# Patient Record
Sex: FEMALE | Race: BLACK OR AFRICAN AMERICAN | NOT HISPANIC OR LATINO | URBAN - METROPOLITAN AREA
[De-identification: names, ages, dates, MRNs, and addresses within clinical notes are randomized per-mention and may not be internally consistent; named-entity substitution may affect disease eponyms.]

---

## 2024-07-28 ENCOUNTER — EMERGENCY (EMERGENCY)
Facility: HOSPITAL | Age: 34
LOS: 0 days | Discharge: HOME | End: 2024-07-28
Attending: STUDENT IN AN ORGANIZED HEALTH CARE EDUCATION/TRAINING PROGRAM
Payer: SELF-PAY

## 2024-07-28 VITALS
DIASTOLIC BLOOD PRESSURE: 73 MMHG | RESPIRATION RATE: 18 BRPM | OXYGEN SATURATION: 98 % | HEIGHT: 67 IN | TEMPERATURE: 98 F | WEIGHT: 126.1 LBS | SYSTOLIC BLOOD PRESSURE: 108 MMHG | HEART RATE: 80 BPM

## 2024-07-28 VITALS
DIASTOLIC BLOOD PRESSURE: 72 MMHG | HEART RATE: 63 BPM | OXYGEN SATURATION: 100 % | RESPIRATION RATE: 16 BRPM | TEMPERATURE: 99 F | SYSTOLIC BLOOD PRESSURE: 104 MMHG

## 2024-07-28 DIAGNOSIS — R11.2 NAUSEA WITH VOMITING, UNSPECIFIED: ICD-10-CM

## 2024-07-28 DIAGNOSIS — Z86.69 PERSONAL HISTORY OF OTHER DISEASES OF THE NERVOUS SYSTEM AND SENSE ORGANS: ICD-10-CM

## 2024-07-28 DIAGNOSIS — R51.9 HEADACHE, UNSPECIFIED: ICD-10-CM

## 2024-07-28 DIAGNOSIS — G43.909 MIGRAINE, UNSPECIFIED, NOT INTRACTABLE, WITHOUT STATUS MIGRAINOSUS: ICD-10-CM

## 2024-07-28 PROCEDURE — 99053 MED SERV 10PM-8AM 24 HR FAC: CPT

## 2024-07-28 PROCEDURE — 99284 EMERGENCY DEPT VISIT MOD MDM: CPT

## 2024-07-28 RX ORDER — SUMATRIPTAN 100 MG/1
1 TABLET, FILM COATED ORAL
Qty: 5 | Refills: 0
Start: 2024-07-28 | End: 2024-08-01

## 2024-07-28 RX ORDER — SUMATRIPTAN 100 MG/1
50 TABLET, FILM COATED ORAL ONCE
Refills: 0 | Status: COMPLETED | OUTPATIENT
Start: 2024-07-28 | End: 2024-07-28

## 2024-07-28 RX ORDER — RIZATRIPTAN BENZOATE 5 MG/1
1 TABLET ORAL
Qty: 5 | Refills: 0
Start: 2024-07-28 | End: 2024-08-11

## 2024-07-28 RX ADMIN — Medication 1000 MILLILITER(S): at 08:01

## 2024-07-28 RX ADMIN — SUMATRIPTAN 50 MILLIGRAM(S): 100 TABLET, FILM COATED ORAL at 07:45

## 2024-09-19 ENCOUNTER — EMERGENCY (EMERGENCY)
Facility: HOSPITAL | Age: 34
LOS: 1 days | Discharge: HOME | End: 2024-09-19
Attending: STUDENT IN AN ORGANIZED HEALTH CARE EDUCATION/TRAINING PROGRAM | Admitting: STUDENT IN AN ORGANIZED HEALTH CARE EDUCATION/TRAINING PROGRAM
Payer: MEDICAID

## 2024-09-19 VITALS
TEMPERATURE: 98 F | DIASTOLIC BLOOD PRESSURE: 69 MMHG | WEIGHT: 128.09 LBS | OXYGEN SATURATION: 98 % | RESPIRATION RATE: 20 BRPM | SYSTOLIC BLOOD PRESSURE: 102 MMHG | HEART RATE: 84 BPM

## 2024-09-19 LAB
ALBUMIN SERPL ELPH-MCNC: 4.8 G/DL — SIGNIFICANT CHANGE UP (ref 3.3–5)
ALP SERPL-CCNC: 48 U/L — SIGNIFICANT CHANGE UP (ref 40–120)
ALT FLD-CCNC: 12 U/L — SIGNIFICANT CHANGE UP (ref 4–33)
AMPHET UR-MCNC: NEGATIVE — SIGNIFICANT CHANGE UP
ANION GAP SERPL CALC-SCNC: 14 MMOL/L — SIGNIFICANT CHANGE UP (ref 7–14)
APAP SERPL-MCNC: <10 UG/ML — LOW (ref 15–25)
APPEARANCE UR: ABNORMAL
AST SERPL-CCNC: 16 U/L — SIGNIFICANT CHANGE UP (ref 4–32)
BACTERIA # UR AUTO: NEGATIVE /HPF — SIGNIFICANT CHANGE UP
BARBITURATES UR SCN-MCNC: NEGATIVE — SIGNIFICANT CHANGE UP
BASOPHILS # BLD AUTO: 0.02 K/UL — SIGNIFICANT CHANGE UP (ref 0–0.2)
BASOPHILS NFR BLD AUTO: 0.2 % — SIGNIFICANT CHANGE UP (ref 0–2)
BENZODIAZ UR-MCNC: NEGATIVE — SIGNIFICANT CHANGE UP
BILIRUB SERPL-MCNC: 0.4 MG/DL — SIGNIFICANT CHANGE UP (ref 0.2–1.2)
BILIRUB UR-MCNC: NEGATIVE — SIGNIFICANT CHANGE UP
BUN SERPL-MCNC: 25 MG/DL — HIGH (ref 7–23)
CALCIUM SERPL-MCNC: 9.9 MG/DL — SIGNIFICANT CHANGE UP (ref 8.4–10.5)
CAST: 1 /LPF — SIGNIFICANT CHANGE UP (ref 0–4)
CHLORIDE SERPL-SCNC: 102 MMOL/L — SIGNIFICANT CHANGE UP (ref 98–107)
CO2 SERPL-SCNC: 23 MMOL/L — SIGNIFICANT CHANGE UP (ref 22–31)
COCAINE METAB.OTHER UR-MCNC: NEGATIVE — SIGNIFICANT CHANGE UP
COLOR SPEC: YELLOW — SIGNIFICANT CHANGE UP
CREAT SERPL-MCNC: 0.91 MG/DL — SIGNIFICANT CHANGE UP (ref 0.5–1.3)
CREATININE URINE RESULT, DAU: 234 MG/DL — SIGNIFICANT CHANGE UP
DIFF PNL FLD: NEGATIVE — SIGNIFICANT CHANGE UP
EGFR: 85 ML/MIN/1.73M2 — SIGNIFICANT CHANGE UP
EGFR: 85 ML/MIN/1.73M2 — SIGNIFICANT CHANGE UP
EOSINOPHIL # BLD AUTO: 0 K/UL — SIGNIFICANT CHANGE UP (ref 0–0.5)
EOSINOPHIL NFR BLD AUTO: 0 % — SIGNIFICANT CHANGE UP (ref 0–6)
ETHANOL SERPL-MCNC: <10 MG/DL — SIGNIFICANT CHANGE UP
FENTANYL UR QL SCN: NEGATIVE — SIGNIFICANT CHANGE UP
FLUAV AG NPH QL: SIGNIFICANT CHANGE UP
FLUBV AG NPH QL: SIGNIFICANT CHANGE UP
GLUCOSE SERPL-MCNC: 132 MG/DL — HIGH (ref 70–99)
GLUCOSE UR QL: NEGATIVE MG/DL — SIGNIFICANT CHANGE UP
HCG SERPL-ACNC: <1 MIU/ML — SIGNIFICANT CHANGE UP
HCT VFR BLD CALC: 35.8 % — SIGNIFICANT CHANGE UP (ref 34.5–45)
HGB BLD-MCNC: 11.9 G/DL — SIGNIFICANT CHANGE UP (ref 11.5–15.5)
IANC: 6.67 K/UL — SIGNIFICANT CHANGE UP (ref 1.8–7.4)
IMM GRANULOCYTES NFR BLD AUTO: 0.4 % — SIGNIFICANT CHANGE UP (ref 0–0.9)
KETONES UR-MCNC: 15 MG/DL
LEUKOCYTE ESTERASE UR-ACNC: NEGATIVE — SIGNIFICANT CHANGE UP
LYMPHOCYTES # BLD AUTO: 1.15 K/UL — SIGNIFICANT CHANGE UP (ref 1–3.3)
LYMPHOCYTES # BLD AUTO: 13.9 % — SIGNIFICANT CHANGE UP (ref 13–44)
MCHC RBC-ENTMCNC: 26.4 PG — LOW (ref 27–34)
MCHC RBC-ENTMCNC: 33.2 GM/DL — SIGNIFICANT CHANGE UP (ref 32–36)
MCV RBC AUTO: 79.4 FL — LOW (ref 80–100)
METHADONE UR-MCNC: NEGATIVE — SIGNIFICANT CHANGE UP
MONOCYTES # BLD AUTO: 0.4 K/UL — SIGNIFICANT CHANGE UP (ref 0–0.9)
MONOCYTES NFR BLD AUTO: 4.8 % — SIGNIFICANT CHANGE UP (ref 2–14)
NEUTROPHILS # BLD AUTO: 6.67 K/UL — SIGNIFICANT CHANGE UP (ref 1.8–7.4)
NEUTROPHILS NFR BLD AUTO: 80.7 % — HIGH (ref 43–77)
NITRITE UR-MCNC: NEGATIVE — SIGNIFICANT CHANGE UP
NRBC # BLD AUTO: 0 K/UL — SIGNIFICANT CHANGE UP (ref 0–0)
NRBC # BLD: 0 /100 WBCS — SIGNIFICANT CHANGE UP (ref 0–0)
NRBC # FLD: 0 K/UL — SIGNIFICANT CHANGE UP (ref 0–0)
NRBC BLD-RTO: 0 /100 WBCS — SIGNIFICANT CHANGE UP (ref 0–0)
OPIATES UR-MCNC: NEGATIVE — SIGNIFICANT CHANGE UP
OXYCODONE UR-MCNC: NEGATIVE — SIGNIFICANT CHANGE UP
PCP SPEC-MCNC: SIGNIFICANT CHANGE UP
PCP UR-MCNC: NEGATIVE — SIGNIFICANT CHANGE UP
PH UR: 5.5 — SIGNIFICANT CHANGE UP (ref 5–8)
PLATELET # BLD AUTO: 352 K/UL — SIGNIFICANT CHANGE UP (ref 150–400)
POTASSIUM SERPL-MCNC: 4.1 MMOL/L — SIGNIFICANT CHANGE UP (ref 3.5–5.3)
POTASSIUM SERPL-SCNC: 4.1 MMOL/L — SIGNIFICANT CHANGE UP (ref 3.5–5.3)
PROT SERPL-MCNC: 8.3 G/DL — SIGNIFICANT CHANGE UP (ref 6–8.3)
PROT UR-MCNC: 30 MG/DL
RBC # BLD: 4.51 M/UL — SIGNIFICANT CHANGE UP (ref 3.8–5.2)
RBC # FLD: 14.6 % — HIGH (ref 10.3–14.5)
RBC CASTS # UR COMP ASSIST: 2 /HPF — SIGNIFICANT CHANGE UP (ref 0–4)
RSV RNA NPH QL NAA+NON-PROBE: SIGNIFICANT CHANGE UP
SALICYLATES SERPL-MCNC: <0.3 MG/DL — LOW (ref 15–30)
SARS-COV-2 RNA SPEC QL NAA+PROBE: SIGNIFICANT CHANGE UP
SODIUM SERPL-SCNC: 139 MMOL/L — SIGNIFICANT CHANGE UP (ref 135–145)
SP GR SPEC: 1.03 — SIGNIFICANT CHANGE UP (ref 1–1.03)
SQUAMOUS # UR AUTO: 3 /HPF — SIGNIFICANT CHANGE UP (ref 0–5)
THC UR QL: POSITIVE
TOXICOLOGY SCREEN, DRUGS OF ABUSE, SERUM RESULT: SIGNIFICANT CHANGE UP
TSH SERPL-MCNC: 0.6 UIU/ML — SIGNIFICANT CHANGE UP (ref 0.27–4.2)
UROBILINOGEN FLD QL: 0.2 MG/DL — SIGNIFICANT CHANGE UP (ref 0.2–1)
WBC # BLD: 8.27 K/UL — SIGNIFICANT CHANGE UP (ref 3.8–10.5)
WBC # FLD AUTO: 8.27 K/UL — SIGNIFICANT CHANGE UP (ref 3.8–10.5)
WBC UR QL: 1 /HPF — SIGNIFICANT CHANGE UP (ref 0–5)

## 2024-09-19 PROCEDURE — 99284 EMERGENCY DEPT VISIT MOD MDM: CPT

## 2024-09-19 RX ORDER — ALPRAZOLAM 0.5 MG
1 TABLET, EXTENDED RELEASE 24 HR ORAL
Qty: 1 | Refills: 0
Start: 2024-09-19 | End: 2024-09-19

## 2024-09-19 RX ADMIN — Medication 1000 MILLILITER(S): at 13:16

## 2024-10-10 ENCOUNTER — EMERGENCY (EMERGENCY)
Facility: HOSPITAL | Age: 34
LOS: 1 days | Discharge: HOME | End: 2024-10-10
Attending: STUDENT IN AN ORGANIZED HEALTH CARE EDUCATION/TRAINING PROGRAM | Admitting: STUDENT IN AN ORGANIZED HEALTH CARE EDUCATION/TRAINING PROGRAM
Payer: MEDICAID

## 2024-10-10 VITALS
TEMPERATURE: 98 F | OXYGEN SATURATION: 99 % | DIASTOLIC BLOOD PRESSURE: 76 MMHG | HEART RATE: 91 BPM | RESPIRATION RATE: 17 BRPM | SYSTOLIC BLOOD PRESSURE: 117 MMHG

## 2024-10-10 PROBLEM — G43.909 MIGRAINE, UNSPECIFIED, NOT INTRACTABLE, WITHOUT STATUS MIGRAINOSUS: Chronic | Status: ACTIVE | Noted: 2024-07-28

## 2024-10-10 PROBLEM — D64.9 ANEMIA, UNSPECIFIED: Chronic | Status: ACTIVE | Noted: 2024-09-19

## 2024-10-10 PROBLEM — F41.9 ANXIETY DISORDER, UNSPECIFIED: Chronic | Status: ACTIVE | Noted: 2024-09-19

## 2024-10-10 LAB
ALBUMIN SERPL ELPH-MCNC: 4.7 G/DL — SIGNIFICANT CHANGE UP (ref 3.3–5)
ALP SERPL-CCNC: 50 U/L — SIGNIFICANT CHANGE UP (ref 40–120)
ALT FLD-CCNC: 10 U/L — SIGNIFICANT CHANGE UP (ref 4–33)
ANION GAP SERPL CALC-SCNC: 15 MMOL/L — HIGH (ref 7–14)
AST SERPL-CCNC: 21 U/L — SIGNIFICANT CHANGE UP (ref 4–32)
BILIRUB SERPL-MCNC: 0.2 MG/DL — SIGNIFICANT CHANGE UP (ref 0.2–1.2)
BUN SERPL-MCNC: 19 MG/DL — SIGNIFICANT CHANGE UP (ref 7–23)
CALCIUM SERPL-MCNC: 9.6 MG/DL — SIGNIFICANT CHANGE UP (ref 8.4–10.5)
CHLORIDE SERPL-SCNC: 105 MMOL/L — SIGNIFICANT CHANGE UP (ref 98–107)
CO2 SERPL-SCNC: 20 MMOL/L — LOW (ref 22–31)
CREAT SERPL-MCNC: 0.83 MG/DL — SIGNIFICANT CHANGE UP (ref 0.5–1.3)
EGFR: 95 ML/MIN/1.73M2 — SIGNIFICANT CHANGE UP
GLUCOSE SERPL-MCNC: 120 MG/DL — HIGH (ref 70–99)
HCG SERPL-ACNC: <1 MIU/ML — SIGNIFICANT CHANGE UP
HCT VFR BLD CALC: 35.9 % — SIGNIFICANT CHANGE UP (ref 34.5–45)
HGB BLD-MCNC: 11.9 G/DL — SIGNIFICANT CHANGE UP (ref 11.5–15.5)
LIDOCAIN IGE QN: 77 U/L — HIGH (ref 7–60)
MCHC RBC-ENTMCNC: 26.4 PG — LOW (ref 27–34)
MCHC RBC-ENTMCNC: 33.1 GM/DL — SIGNIFICANT CHANGE UP (ref 32–36)
MCV RBC AUTO: 79.6 FL — LOW (ref 80–100)
NRBC # BLD: 0 /100 WBCS — SIGNIFICANT CHANGE UP (ref 0–0)
NRBC # FLD: 0 K/UL — SIGNIFICANT CHANGE UP (ref 0–0)
PLATELET # BLD AUTO: 273 K/UL — SIGNIFICANT CHANGE UP (ref 150–400)
POTASSIUM SERPL-MCNC: 4.1 MMOL/L — SIGNIFICANT CHANGE UP (ref 3.5–5.3)
POTASSIUM SERPL-SCNC: 4.1 MMOL/L — SIGNIFICANT CHANGE UP (ref 3.5–5.3)
PROT SERPL-MCNC: 8.2 G/DL — SIGNIFICANT CHANGE UP (ref 6–8.3)
RBC # BLD: 4.51 M/UL — SIGNIFICANT CHANGE UP (ref 3.8–5.2)
RBC # FLD: 14 % — SIGNIFICANT CHANGE UP (ref 10.3–14.5)
SODIUM SERPL-SCNC: 140 MMOL/L — SIGNIFICANT CHANGE UP (ref 135–145)
WBC # BLD: 7.41 K/UL — SIGNIFICANT CHANGE UP (ref 3.8–10.5)
WBC # FLD AUTO: 7.41 K/UL — SIGNIFICANT CHANGE UP (ref 3.8–10.5)

## 2024-10-10 PROCEDURE — 93010 ELECTROCARDIOGRAM REPORT: CPT

## 2024-10-10 PROCEDURE — 99284 EMERGENCY DEPT VISIT MOD MDM: CPT

## 2024-10-10 RX ORDER — SODIUM CHLORIDE 0.9 % (FLUSH) 0.9 %
1000 SYRINGE (ML) INJECTION ONCE
Refills: 0 | Status: COMPLETED | OUTPATIENT
Start: 2024-10-10 | End: 2024-10-10

## 2024-10-10 RX ORDER — ONDANSETRON HCL/PF 4 MG/2 ML
4 VIAL (ML) INJECTION ONCE
Refills: 0 | Status: COMPLETED | OUTPATIENT
Start: 2024-10-10 | End: 2024-10-10

## 2024-10-10 RX ORDER — HALOPERIDOL LACTATE 2 MG/ML
2 CONCENTRATE, ORAL ORAL ONCE
Refills: 0 | Status: COMPLETED | OUTPATIENT
Start: 2024-10-10 | End: 2024-10-10

## 2024-10-10 RX ADMIN — Medication 1000 MILLILITER(S): at 17:42

## 2024-10-10 RX ADMIN — Medication 4 MILLIGRAM(S): at 17:43

## 2024-10-10 RX ADMIN — Medication 2 MILLIGRAM(S): at 17:42

## 2024-10-10 NOTE — ED PROVIDER NOTE - CLINICAL SUMMARY MEDICAL DECISION MAKING FREE TEXT BOX
33 yo F with h/o anxiety, daily marijuana use who presents to the ED c/o 3 days of worsening anxiety, nausea, and vomiting. Pt has had similar symptoms before, was told it was due to her marijuana use. On exam, pt has dry mucous membranes but abdomen is soft and non-tender. Most likely cyclic vomiting. Plan for labs, IV fluids, medications for symptom control

## 2024-10-10 NOTE — ED ADULT NURSE NOTE - OBJECTIVE STATEMENT
Received pt to intake 9, A+Ox4, ambulatory. C/O nausea and vomiting x 3 days r/t marijuana consumption. pt states she smokes due to anxiety. Respirations even and unlabored, normal work of breathing, no accessory muscle use, speaking in full clear uninterrupted sentences. ABD is soft, non tender, non distended. Pt denies any chest pain, SOB, headache, dizziness, diarrhea, fever, chills.  20G to RAC, Labs sent, Medicated as per Provider orders, plan of care ongoing, no further concerns as of present, patient expresses no other needs at this time, call light within reach.

## 2024-10-10 NOTE — ED PROVIDER NOTE - PATIENT PORTAL LINK FT
You can access the FollowMyHealth Patient Portal offered by [facility] by registering at the following website: [email or web address]. By joining [employer]’s FollowMyHealth portal, you will also be able to view your health information using other applications (apps) compatible with our system.

## 2024-10-10 NOTE — ED PROVIDER NOTE - ENMT, MLM
Airway patent, Nasal mucosa clear. Throat has no vesicles, no oropharyngeal exudates and uvula is midline.  dry mucous membranes

## 2024-10-10 NOTE — ED PROVIDER NOTE - NSFOLLOWUPINSTRUCTIONS_ED_ALL_ED_FT
You were seen today in the emergency department. You had blood work done which was normal and were treated with medication for your symptoms. Please return to the emergency department for any new or worsening symptoms

## 2024-10-10 NOTE — ED PROVIDER NOTE - OBJECTIVE STATEMENT
33 yo F with h/o anxiety, daily marijuana use who presents to the ED c/o 3 days of worsening anxiety, nausea, and vomiting. She reports that she has been unable to tolerate PO since the symptoms began. She reports that she has had the same symptoms before when she has had increased anxiety and was told it was due to her marijuana use. She has tried to stop smoking in the past but she reports that her anxiety then get worse. No fever, chills, chest pain, SOB, abdominal pain.

## 2024-10-10 NOTE — ED ADULT TRIAGE NOTE - CHIEF COMPLAINT QUOTE
Pt presenting for 3 days of nausea and vomiting. Pt states she was diagnosed with anxiety and started using marijuana. Pt states she knows marijuana is causing the nausea and vomiting and she was told to try "a different strain, or stop using" Phx Anxiety.

## 2024-10-10 NOTE — ED PROVIDER NOTE - NSICDXPASTMEDICALHX_GEN_ALL_CORE_FT
PAST MEDICAL HISTORY:  Anemia     Anxiety     Anxiety     Anxiety and depression     Migraine     Migraines

## 2024-10-10 NOTE — ED PROVIDER NOTE - PROGRESS NOTE DETAILS
MD Phan: Pt re-assessed, feeling improved. Was able to tolerate PO. Will d/c home  Pt has information for [facility] crisis center

## 2024-10-12 ENCOUNTER — EMERGENCY (EMERGENCY)
Facility: HOSPITAL | Age: 34
LOS: 1 days | Discharge: HOME | End: 2024-10-12
Attending: STUDENT IN AN ORGANIZED HEALTH CARE EDUCATION/TRAINING PROGRAM | Admitting: EMERGENCY MEDICINE
Payer: MEDICAID

## 2024-10-12 VITALS
DIASTOLIC BLOOD PRESSURE: 74 MMHG | TEMPERATURE: 99 F | SYSTOLIC BLOOD PRESSURE: 115 MMHG | RESPIRATION RATE: 17 BRPM | OXYGEN SATURATION: 100 % | HEART RATE: 87 BPM | WEIGHT: 128.09 LBS

## 2024-10-12 LAB
ADD ON TEST-SPECIMEN IN LAB: SIGNIFICANT CHANGE UP
ALBUMIN SERPL ELPH-MCNC: 4.3 G/DL — SIGNIFICANT CHANGE UP (ref 3.3–5)
ALP SERPL-CCNC: 43 U/L — SIGNIFICANT CHANGE UP (ref 40–120)
ALT FLD-CCNC: 9 U/L — SIGNIFICANT CHANGE UP (ref 4–33)
ANION GAP SERPL CALC-SCNC: 16 MMOL/L — HIGH (ref 7–14)
APAP SERPL-MCNC: <10 UG/ML — LOW (ref 15–25)
AST SERPL-CCNC: 18 U/L — SIGNIFICANT CHANGE UP (ref 4–32)
BASOPHILS # BLD AUTO: 0.02 K/UL — SIGNIFICANT CHANGE UP (ref 0–0.2)
BASOPHILS NFR BLD AUTO: 0.3 % — SIGNIFICANT CHANGE UP (ref 0–2)
BILIRUB SERPL-MCNC: 0.3 MG/DL — SIGNIFICANT CHANGE UP (ref 0.2–1.2)
BLOOD GAS VENOUS COMPREHENSIVE RESULT: SIGNIFICANT CHANGE UP
BUN SERPL-MCNC: 14 MG/DL — SIGNIFICANT CHANGE UP (ref 7–23)
CALCIUM SERPL-MCNC: 9.2 MG/DL — SIGNIFICANT CHANGE UP (ref 8.4–10.5)
CHLORIDE SERPL-SCNC: 104 MMOL/L — SIGNIFICANT CHANGE UP (ref 98–107)
CO2 SERPL-SCNC: 22 MMOL/L — SIGNIFICANT CHANGE UP (ref 22–31)
CREAT SERPL-MCNC: 0.87 MG/DL — SIGNIFICANT CHANGE UP (ref 0.5–1.3)
EGFR: 90 ML/MIN/1.73M2 — SIGNIFICANT CHANGE UP
EOSINOPHIL # BLD AUTO: 0.12 K/UL — SIGNIFICANT CHANGE UP (ref 0–0.5)
EOSINOPHIL NFR BLD AUTO: 2 % — SIGNIFICANT CHANGE UP (ref 0–6)
ETHANOL SERPL-MCNC: <10 MG/DL — SIGNIFICANT CHANGE UP
GLUCOSE SERPL-MCNC: 90 MG/DL — SIGNIFICANT CHANGE UP (ref 70–99)
HCG SERPL-ACNC: <1 MIU/ML — SIGNIFICANT CHANGE UP
HCT VFR BLD CALC: 34.5 % — SIGNIFICANT CHANGE UP (ref 34.5–45)
HGB BLD-MCNC: 11.8 G/DL — SIGNIFICANT CHANGE UP (ref 11.5–15.5)
IANC: 3.58 K/UL — SIGNIFICANT CHANGE UP (ref 1.8–7.4)
IMM GRANULOCYTES NFR BLD AUTO: 0.3 % — SIGNIFICANT CHANGE UP (ref 0–0.9)
LIDOCAIN IGE QN: 28 U/L — SIGNIFICANT CHANGE UP (ref 7–60)
LYMPHOCYTES # BLD AUTO: 1.68 K/UL — SIGNIFICANT CHANGE UP (ref 1–3.3)
LYMPHOCYTES # BLD AUTO: 28.3 % — SIGNIFICANT CHANGE UP (ref 13–44)
MAGNESIUM SERPL-MCNC: 2.2 MG/DL — SIGNIFICANT CHANGE UP (ref 1.6–2.6)
MCHC RBC-ENTMCNC: 27.4 PG — SIGNIFICANT CHANGE UP (ref 27–34)
MCHC RBC-ENTMCNC: 34.2 GM/DL — SIGNIFICANT CHANGE UP (ref 32–36)
MCV RBC AUTO: 80.2 FL — SIGNIFICANT CHANGE UP (ref 80–100)
MONOCYTES # BLD AUTO: 0.52 K/UL — SIGNIFICANT CHANGE UP (ref 0–0.9)
MONOCYTES NFR BLD AUTO: 8.8 % — SIGNIFICANT CHANGE UP (ref 2–14)
NEUTROPHILS # BLD AUTO: 3.58 K/UL — SIGNIFICANT CHANGE UP (ref 1.8–7.4)
NEUTROPHILS NFR BLD AUTO: 60.3 % — SIGNIFICANT CHANGE UP (ref 43–77)
NRBC # BLD: 0 /100 WBCS — SIGNIFICANT CHANGE UP (ref 0–0)
NRBC # FLD: 0 K/UL — SIGNIFICANT CHANGE UP (ref 0–0)
PHOSPHATE SERPL-MCNC: 2.1 MG/DL — LOW (ref 2.5–4.5)
PLATELET # BLD AUTO: 266 K/UL — SIGNIFICANT CHANGE UP (ref 150–400)
POTASSIUM SERPL-MCNC: 3.6 MMOL/L — SIGNIFICANT CHANGE UP (ref 3.5–5.3)
POTASSIUM SERPL-SCNC: 3.6 MMOL/L — SIGNIFICANT CHANGE UP (ref 3.5–5.3)
PROT SERPL-MCNC: 7.6 G/DL — SIGNIFICANT CHANGE UP (ref 6–8.3)
RBC # BLD: 4.3 M/UL — SIGNIFICANT CHANGE UP (ref 3.8–5.2)
RBC # FLD: 14 % — SIGNIFICANT CHANGE UP (ref 10.3–14.5)
SALICYLATES SERPL-MCNC: <0.3 MG/DL — LOW (ref 15–30)
SODIUM SERPL-SCNC: 142 MMOL/L — SIGNIFICANT CHANGE UP (ref 135–145)
TOXICOLOGY SCREEN, DRUGS OF ABUSE, SERUM RESULT: SIGNIFICANT CHANGE UP
TSH SERPL-MCNC: 0.81 UIU/ML — SIGNIFICANT CHANGE UP (ref 0.27–4.2)
WBC # BLD: 5.94 K/UL — SIGNIFICANT CHANGE UP (ref 3.8–10.5)
WBC # FLD AUTO: 5.94 K/UL — SIGNIFICANT CHANGE UP (ref 3.8–10.5)

## 2024-10-12 PROCEDURE — 99223 1ST HOSP IP/OBS HIGH 75: CPT

## 2024-10-12 RX ORDER — MAG HYDROX/ALUMINUM HYD/SIMETH 200-200-20
30 SUSPENSION, ORAL (FINAL DOSE FORM) ORAL ONCE
Refills: 0 | Status: COMPLETED | OUTPATIENT
Start: 2024-10-12 | End: 2024-10-12

## 2024-10-12 RX ORDER — POTASSIUM PHOSPHATE, MONOBASIC POTASSIUM PHOSPHATE, DIBASIC 224; 236 MG/ML; MG/ML
15 INJECTION, SOLUTION, CONCENTRATE INTRAVENOUS ONCE
Refills: 0 | Status: COMPLETED | OUTPATIENT
Start: 2024-10-12 | End: 2024-10-12

## 2024-10-12 RX ORDER — SODIUM CHLORIDE IRRIG SOLUTION 0.9 %
1000 SOLUTION, IRRIGATION IRRIGATION
Refills: 0 | Status: DISCONTINUED | OUTPATIENT
Start: 2024-10-12 | End: 2024-10-16

## 2024-10-12 RX ORDER — SODIUM CHLORIDE 0.9 % (FLUSH) 0.9 %
2000 SYRINGE (ML) INJECTION ONCE
Refills: 0 | Status: COMPLETED | OUTPATIENT
Start: 2024-10-12 | End: 2024-10-12

## 2024-10-12 RX ORDER — ONDANSETRON HCL/PF 4 MG/2 ML
4 VIAL (ML) INJECTION ONCE
Refills: 0 | Status: COMPLETED | OUTPATIENT
Start: 2024-10-12 | End: 2024-10-12

## 2024-10-12 RX ORDER — FAMOTIDINE 40 MG
20 TABLET ORAL ONCE
Refills: 0 | Status: COMPLETED | OUTPATIENT
Start: 2024-10-12 | End: 2024-10-12

## 2024-10-12 RX ADMIN — Medication 100 MILLILITER(S): at 21:44

## 2024-10-12 RX ADMIN — Medication 20 MILLIGRAM(S): at 16:12

## 2024-10-12 RX ADMIN — Medication 0.5 MILLIGRAM(S): at 17:30

## 2024-10-12 RX ADMIN — Medication 4 MILLIGRAM(S): at 16:12

## 2024-10-12 RX ADMIN — Medication 4000 MILLILITER(S): at 16:12

## 2024-10-12 RX ADMIN — POTASSIUM PHOSPHATE, MONOBASIC POTASSIUM PHOSPHATE, DIBASIC 62.5 MILLIMOLE(S): 224; 236 INJECTION, SOLUTION, CONCENTRATE INTRAVENOUS at 17:31

## 2024-10-12 NOTE — ED PROVIDER NOTE - OBJECTIVE STATEMENT
34 year old female with past medical history of anxiety presents to the ED complaining of nausea and multiple episodes of nonbloody nonbilious vomiting for the past 2 days.  Has a history of anxiety which she is being treated with cannabinoid by her psychiatrist from [place], and she says for the past few days she has not been able to tolerate anything by mouth.  She was seen in our ER 2 days ago with similar symptoms and sent home, and she states she started vomiting again as soon as she got home.  She denies any abdominal pain, nausea, vomiting, fevers, chills, urinary symptoms.  She states she felt anxious again when she got home and again used cannabinoid and that led to more and more vomiting.  She denies any other complaints.  She denies any suicidal/homicidal thoughts.

## 2024-10-12 NOTE — ED CDU PROVIDER INITIAL DAY NOTE - CLINICAL SUMMARY MEDICAL DECISION MAKING FREE TEXT BOX
34 year old female with cyclic vomiting secondary to cannabinoid use for treatment of anxiety. HD stable, well appearing, in no acute distress, abdomen soft nontender. Labs reviewed, no metabolic disturbance, Phos 2.1 without no other electrolytes imbalance. CDU plan IV hydration, antiemetic, replenish phos, advance diet as tolerated.

## 2024-10-12 NOTE — ED PROVIDER NOTE - CLINICAL SUMMARY MEDICAL DECISION MAKING FREE TEXT BOX
34 year old female with past medical history of anxiety presents to the ED complaining of nausea and multiple episodes of nonbloody nonbilious vomiting for the past 2 days.  Has a history of anxiety which she is being treated with cannabinoid by her psychiatrist from [place], and she says for the past few days she has not been able to tolerate anything by mouth. Pt with stable hemodynamics, well-appearing, in no acute distress, abdomen soft nontender.  Impression: nausea/vomiting likely cannaniboid hyperemesis; anxiety with no concern for SI/HI at this time, patient is very coherent, not delusional.  Plan for labs to rule out any electrolyte/metabolic disturbance, low-dose anxiolytics, symptomatic care, if able to tolerate p.o. will DC home with Dr. Escalera follow-up versus CDU stay to advance diet and symptomatic care.  I made the patient an appointment with Dr. Escalera for Monday, October 14 at 12:30 PM.

## 2024-10-12 NOTE — ED PROVIDER NOTE - ATTENDING APP SHARED VISIT CONTRIBUTION OF CARE
35 yo F hx anxiety, on daily medical marijuana for anxiety, presenting with complaints of intractable nv. Pt seen in ED on 10/10 for similar complaints. She was able to tolerate PO at the time, but then felt she had to smoke for her anxiety and symptoms recurred. Pt does not feel she will be able to make it through the night at home without vomiting. No new symptoms today. Denies SI/HI, auditory or visual hallucinations.    VITALS: reviewed  GEN: NAD, A & O x 4  HEAD/EYES: NCAT, EOMI, anicteric sclerae,   ENT: mucus membranes moist, oropharynx WNL, trachea midline,  RESP: lungs CTA with equal breath sounds bilaterally, chest wall nontender and atraumatic  CV: heart with reg rhythm S1, S2, distal pulses intact and symmetric bilaterally  ABDOMEN: normoactive bowel sounds, soft, nondistended, nontender, no palpable masses  : no CVAT  MSK: extremities atraumatic and nontender, no edema, no asymmetry.  SKIN: warm, dry, no rash, no bruising, no cyanosis. color appropriate for ethnicity  NEURO: alert, mentating appropriately, no facial asymmetry.   PSYCH: anxious appearing    Plan for antiemetics, fluids, labs, and likely cdu for IV hydration and reassess until patient can tolerate PO

## 2024-10-12 NOTE — ED CDU PROVIDER INITIAL DAY NOTE - OBJECTIVE STATEMENT
Initial ED provider note: "34 year old female with past medical history of anxiety presents to the ED complaining of nausea and multiple episodes of nonbloody nonbilious vomiting for the past 2 days.  Has a history of anxiety which she is being treated with cannabinoid by her psychiatrist from [place], and she says for the past few days she has not been able to tolerate anything by mouth.  She was seen in our ER 2 days ago with similar symptoms and sent home, and she states she started vomiting again as soon as she got home.  She denies any abdominal pain, nausea, vomiting, fevers, chills, urinary symptoms.  She states she felt anxious again when she got home and again used cannabinoid and that led to more and more vomiting.  She denies any other complaints.  She denies any suicidal/homicidal thoughts."    CDU note. 34 year old female with cyclic vomiting secondary to cannabinoid use for treatment of anxiety. HD stable, well appearing, in no acute distress, abdomen soft nontender. Labs reviewed, no metabolic disturbance, Phos 2.1 without no other electrolytes imbalance. CDU plan IV hydration, antiemetic, replenish phos, advance diet as tolerated.

## 2024-10-12 NOTE — ED ADULT NURSE NOTE - NSFALLUNIVINTERV_ED_ALL_ED
Bed/Stretcher in lowest position, wheels locked, appropriate side rails in place/Call bell, personal items and telephone in reach/Instruct patient to call for assistance before getting out of bed/chair/stretcher/Non-slip footwear applied when patient is off stretcher/Interior to call system/Physically safe environment - no spills, clutter or unnecessary equipment/Purposeful proactive rounding/Room/bathroom lighting operational, light cord in reach

## 2024-10-12 NOTE — ED PROVIDER NOTE - PROGRESS NOTE DETAILS
ESTEBAN Jack: I made an appointment online for the patient at [facility] for Monday Oct 14 at 12:30pm.

## 2024-10-12 NOTE — ED ADULT NURSE NOTE - OBJECTIVE STATEMENT
Break RN - Pt received to intake   , awake and alert, A&OX4, ambulatory. C/o vomiting after  smoking weed. pt smokes weed for her anixtey , it is prescribed to her. pt denies any other drug use. pt states that this always happens to her the week before her period. pt denies any SI/HI.    Respirations even and unlabored. Denies CP, SOB,  HA, dizziness, palpitations, blurry vision. 20G IV placed to right AC   . Bed in lowest position, call bell within reach. Safety maintained.

## 2024-10-12 NOTE — ED CDU PROVIDER INITIAL DAY NOTE - CPE EDP GASTRO NORM
Received high BP alert on 11/27/19 however, reading was likely an error as patient repeated reading and it was at goal. Will follow-up as scheduled.    normal...

## 2024-10-13 VITALS
DIASTOLIC BLOOD PRESSURE: 76 MMHG | HEART RATE: 86 BPM | OXYGEN SATURATION: 98 % | TEMPERATURE: 98 F | RESPIRATION RATE: 17 BRPM | SYSTOLIC BLOOD PRESSURE: 115 MMHG

## 2024-10-13 LAB
ANION GAP SERPL CALC-SCNC: 12 MMOL/L — SIGNIFICANT CHANGE UP (ref 7–14)
BASOPHILS # BLD AUTO: 0.04 K/UL — SIGNIFICANT CHANGE UP (ref 0–0.2)
BASOPHILS NFR BLD AUTO: 0.8 % — SIGNIFICANT CHANGE UP (ref 0–2)
BUN SERPL-MCNC: 8 MG/DL — SIGNIFICANT CHANGE UP (ref 7–23)
CALCIUM SERPL-MCNC: 8 MG/DL — LOW (ref 8.4–10.5)
CHLORIDE SERPL-SCNC: 108 MMOL/L — HIGH (ref 98–107)
CO2 SERPL-SCNC: 21 MMOL/L — LOW (ref 22–31)
CREAT SERPL-MCNC: 0.75 MG/DL — SIGNIFICANT CHANGE UP (ref 0.5–1.3)
EGFR: 108 ML/MIN/1.73M2 — SIGNIFICANT CHANGE UP
EOSINOPHIL # BLD AUTO: 0.09 K/UL — SIGNIFICANT CHANGE UP (ref 0–0.5)
EOSINOPHIL NFR BLD AUTO: 1.9 % — SIGNIFICANT CHANGE UP (ref 0–6)
GLUCOSE SERPL-MCNC: 107 MG/DL — HIGH (ref 70–99)
HCT VFR BLD CALC: 31.2 % — LOW (ref 34.5–45)
HGB BLD-MCNC: 10.5 G/DL — LOW (ref 11.5–15.5)
IANC: 3.06 K/UL — SIGNIFICANT CHANGE UP (ref 1.8–7.4)
IMM GRANULOCYTES NFR BLD AUTO: 0.2 % — SIGNIFICANT CHANGE UP (ref 0–0.9)
LYMPHOCYTES # BLD AUTO: 1.16 K/UL — SIGNIFICANT CHANGE UP (ref 1–3.3)
LYMPHOCYTES # BLD AUTO: 24.5 % — SIGNIFICANT CHANGE UP (ref 13–44)
MAGNESIUM SERPL-MCNC: 1.9 MG/DL — SIGNIFICANT CHANGE UP (ref 1.6–2.6)
MCHC RBC-ENTMCNC: 26.9 PG — LOW (ref 27–34)
MCHC RBC-ENTMCNC: 33.7 GM/DL — SIGNIFICANT CHANGE UP (ref 32–36)
MCV RBC AUTO: 79.8 FL — LOW (ref 80–100)
MONOCYTES # BLD AUTO: 0.37 K/UL — SIGNIFICANT CHANGE UP (ref 0–0.9)
MONOCYTES NFR BLD AUTO: 7.8 % — SIGNIFICANT CHANGE UP (ref 2–14)
NEUTROPHILS # BLD AUTO: 3.06 K/UL — SIGNIFICANT CHANGE UP (ref 1.8–7.4)
NEUTROPHILS NFR BLD AUTO: 64.8 % — SIGNIFICANT CHANGE UP (ref 43–77)
NRBC # BLD: 0 /100 WBCS — SIGNIFICANT CHANGE UP (ref 0–0)
NRBC # FLD: 0 K/UL — SIGNIFICANT CHANGE UP (ref 0–0)
PHOSPHATE SERPL-MCNC: 2.4 MG/DL — LOW (ref 2.5–4.5)
PLATELET # BLD AUTO: 231 K/UL — SIGNIFICANT CHANGE UP (ref 150–400)
POTASSIUM SERPL-MCNC: 3.4 MMOL/L — LOW (ref 3.5–5.3)
POTASSIUM SERPL-SCNC: 3.4 MMOL/L — LOW (ref 3.5–5.3)
RBC # BLD: 3.91 M/UL — SIGNIFICANT CHANGE UP (ref 3.8–5.2)
RBC # FLD: 14 % — SIGNIFICANT CHANGE UP (ref 10.3–14.5)
SODIUM SERPL-SCNC: 141 MMOL/L — SIGNIFICANT CHANGE UP (ref 135–145)
WBC # BLD: 4.73 K/UL — SIGNIFICANT CHANGE UP (ref 3.8–10.5)
WBC # FLD AUTO: 4.73 K/UL — SIGNIFICANT CHANGE UP (ref 3.8–10.5)

## 2024-10-13 PROCEDURE — 99239 HOSP IP/OBS DSCHRG MGMT >30: CPT

## 2024-10-13 RX ORDER — FAMOTIDINE 40 MG
20 TABLET ORAL ONCE
Refills: 0 | Status: COMPLETED | OUTPATIENT
Start: 2024-10-13 | End: 2024-10-13

## 2024-10-13 RX ORDER — METOCLOPRAMIDE HCL 5 MG
10 TABLET ORAL ONCE
Refills: 0 | Status: COMPLETED | OUTPATIENT
Start: 2024-10-13 | End: 2024-10-13

## 2024-10-13 RX ORDER — HALOPERIDOL LACTATE 2 MG/ML
2.5 CONCENTRATE, ORAL ORAL ONCE
Refills: 0 | Status: DISCONTINUED | OUTPATIENT
Start: 2024-10-13 | End: 2024-10-13

## 2024-10-13 RX ORDER — POTASSIUM PHOSPHATE, MONOBASIC POTASSIUM PHOSPHATE, DIBASIC 224; 236 MG/ML; MG/ML
15 INJECTION, SOLUTION, CONCENTRATE INTRAVENOUS ONCE
Refills: 0 | Status: COMPLETED | OUTPATIENT
Start: 2024-10-13 | End: 2024-10-13

## 2024-10-13 RX ORDER — HALOPERIDOL LACTATE 2 MG/ML
2.5 CONCENTRATE, ORAL ORAL ONCE
Refills: 0 | Status: COMPLETED | OUTPATIENT
Start: 2024-10-13 | End: 2024-10-13

## 2024-10-13 RX ORDER — ONDANSETRON HCL/PF 4 MG/2 ML
4 VIAL (ML) INJECTION EVERY 4 HOURS
Refills: 0 | Status: DISCONTINUED | OUTPATIENT
Start: 2024-10-13 | End: 2024-10-16

## 2024-10-13 RX ORDER — FAMOTIDINE 40 MG
20 TABLET ORAL EVERY 12 HOURS
Refills: 0 | Status: DISCONTINUED | OUTPATIENT
Start: 2024-10-13 | End: 2024-10-16

## 2024-10-13 RX ORDER — SALINE LAXATIVE 7; 19 G/118ML; G/118ML
1 ENEMA RECTAL ONCE
Refills: 0 | Status: COMPLETED | OUTPATIENT
Start: 2024-10-13 | End: 2024-10-13

## 2024-10-13 RX ORDER — ONDANSETRON HCL/PF 4 MG/2 ML
1 VIAL (ML) INJECTION
Qty: 20 | Refills: 0
Start: 2024-10-13 | End: 2024-10-17

## 2024-10-13 RX ORDER — MAG HYDROX/ALUMINUM HYD/SIMETH 200-200-20
30 SUSPENSION, ORAL (FINAL DOSE FORM) ORAL EVERY 6 HOURS
Refills: 0 | Status: DISCONTINUED | OUTPATIENT
Start: 2024-10-13 | End: 2024-10-16

## 2024-10-13 RX ORDER — HYDROXYZINE PAMOATE 50 MG
1 CAPSULE ORAL
Qty: 15 | Refills: 0
Start: 2024-10-13 | End: 2024-10-17

## 2024-10-13 RX ADMIN — Medication 20 MILLIGRAM(S): at 06:42

## 2024-10-13 RX ADMIN — Medication 2.5 MILLIGRAM(S): at 10:15

## 2024-10-13 RX ADMIN — Medication 20 MILLIGRAM(S): at 01:48

## 2024-10-13 RX ADMIN — POTASSIUM PHOSPHATE, MONOBASIC POTASSIUM PHOSPHATE, DIBASIC 62.5 MILLIMOLE(S): 224; 236 INJECTION, SOLUTION, CONCENTRATE INTRAVENOUS at 09:14

## 2024-10-13 RX ADMIN — Medication 1 MILLIGRAM(S): at 05:21

## 2024-10-13 RX ADMIN — Medication 10 MILLIGRAM(S): at 04:56

## 2024-10-13 RX ADMIN — SALINE LAXATIVE 1 ENEMA: 7; 19 ENEMA RECTAL at 09:14

## 2024-10-13 RX ADMIN — Medication 4 MILLIGRAM(S): at 01:28

## 2024-10-13 NOTE — ED CDU PROVIDER SUBSEQUENT DAY NOTE - CLINICAL SUMMARY MEDICAL DECISION MAKING FREE TEXT BOX
33 yo female, PMH anxiety, depression, anemia, migraines, on marijuana as tx for her anxiety/depression by PMD in [place] (pt visiting [place]), presented to this ED c/o N/V/inability to tolerate PO.  Pt was seen previously on 10/10 for cyclic vomiting; was dc'd home, presented back to the ED for persisting symptoms.    In the ED, HD stable, well appearing, in no acute distress, abdomen soft nontender. Labs reviewed, no metabolic disturbance, Phos 2.1 without no other electrolytes imbalance.  Pt was sent to CDU for continued supportive care plan with IV hydration, antiemetics PRN, advance diet as tolerated.  In the interim, pt objectively noted to be resting comfortably; pt has been clinically stable; sporadic attempts overnight of clear liquids resulted in nausea x 2 episodes, nausea/retching x 1 episode.  Antiemetics and later Ativan, gave episodic relief.  No other issues thus far.  AM labs ordered.

## 2024-10-13 NOTE — ED CDU PROVIDER DISPOSITION NOTE - NSFOLLOWUPINSTRUCTIONS_ED_ALL_ED_FT
Follow up with your PMD within 1-2 days or you can call our clinic at [phone] for an appointment  Take Zofran 4mg 1 tab every 4-6 hrs as needed for vomiting.  Take all of your other medications as previously prescribed.  Worsening, continued or ANY new concerning symptoms return to the Emergency Department.     Cyclic Vomiting Syndrome, Adult  Cyclic vomiting syndrome (CVS) is a condition that causes episodes of severe nausea and vomiting. It can last for hours or even days. Attacks may occur several times a month or several times a year. Between episodes of CVS, you may be otherwise healthy.    What are the causes?  The cause of this condition is not known. Although many of the episodes can happen for no obvious reason, you may have specific CVS triggers. Episodes may be triggered by:  An infection, especially colds and the flu.  Emotional stress, including excitement or anxiety about finances, relationships, or moving.  Certain foods or beverages, such as chocolate, cheese, alcohol, and food additives.  Food allergies.  Motion sickness.  Eating a large meal before bed.  Being very tired.  Being overheated.  Menstruation.  Long-term cannabis use.  What increases the risk?  You are more likely to develop this condition if:  You get migraine headaches.  You have a family history of CVS or migraine headaches.  What are the signs or symptoms?  Symptoms tend to happen at the same time of day, and each episode tends to last about the same amount of time. Symptoms commonly start at night or when you wake up. Many people have warning signs (prodrome) before an episode, which may include slight nausea, sweating, and pale skin (pallor).    The most common symptoms of a CVS attack include:  Severe vomiting. Vomiting may happen every 5–15 minutes.  Severe nausea.  Gagging (retching).  Other symptoms may include:  Headache.  Dizziness.  Sensitivity to light or sound.  Abdominal pain. This can be severe.  Loose stools or diarrhea.  Weakness or exhaustion.  Dehydration. This can cause:  Thirst.  Dry mouth.  Decreased urination.  Fatigue.  How is this diagnosed?  This condition may be diagnosed based on your symptoms, medical history, and family history of CVS or migraine. Your health care provider will ask whether you have had:  Episodes of severe nausea and vomiting that have happened a total of 5 or more times, or 3 or more times in the past 6 months.  Episodes that last for 1 hour or more, and occur 1 week apart or further apart.  Episodes that are similar each time.  Normal health between episodes.  Your health care provider will also do a physical exam. To rule out other conditions, you may have tests, such as:  Blood tests.  Urine tests.  Imaging tests.  How is this treated?  A prescription pill bottle with an example of a pill.  There is no cure for this condition, but treatment can help manage or prevent CVS episodes. Work with your health care provider to find the best treatment for you. Treatment may include:  Avoiding stress and CVS triggers.  Eating smaller, more frequent meals.  Taking medicines, such as:  Anti-nausea medicines.  Antacids.  Antidepressants.  Antihistamines.  Medicines for migraines.  Over-the-counter pain medicine.  Over-the counter diet supplements.  Severe nausea and vomiting may require you to stay at the hospital. You may need IV fluids to prevent or treat dehydration.    Follow these instructions at home:  During an episode    Take over-the-counter and prescription medicines only as told by your health care provider.  Stay in bed and rest in a dark, quiet room.  After an episode    Illustration of a person drinking a glass of water.  Drink an oral rehydration solution (ORS), if directed by your health care provider. This is a drink that helps you replace fluids and the salts and minerals in your blood (electrolytes). It can be found at pharmacies and retail stores.  Drink small amounts of clear fluids slowly and gradually add more.  Drink clear fluids such as water or fruit juice that has water added (is diluted). You may also eat low-calorie popsicles.  Avoid drinking fluids that contain a lot of sugar or caffeine, such as sports drinks and soda.  Eat soft foods in small amounts every 3–4 hours. Eat your regular diet, but avoid spicy or fatty foods, such as french fries and pizza.  General instructions    Monitor your condition for any changes.  Keep track of your attacks and symptoms, and pay attention to any triggers. Avoid those triggers when you can.  If you use cannabis, stop using it right away. Ending cannabis use can reduce or even stop your cyclic vomiting syndrome.  Keep all follow-up visits. This is important.  Where to find more information  Cyclic Vomiting Syndrome Association: cvsaonline.org  Contact a health care provider if:  Your condition gets worse.  You cannot drink fluids without vomiting.  You have pain and trouble swallowing after an episode.  Get help right away if:  You have blood in your vomit.  Your vomit looks like coffee grounds.  You have stools that are bloody or black, or stools that look like tar.  You have signs of dehydration, such as:  Sunken eyes.  Not making tears while crying.  Very dry mouth or cracked lips.  Decreased urine production.  Dark urine. Urine may be the color of tea.  Weakness.  Sleepiness.  These symptoms may be an emergency. Get help right away. Call 911.  Do not wait to see if the symptoms will go away.  Do not drive yourself to the hospital.  Summary  Cyclic vomiting syndrome (CVS) causes episodes of severe nausea and vomiting that can last for hours or even days.  Treatment can help you manage or prevent CVS episodes. Work with your health care provider to find the best treatment for you.  Vomiting and diarrhea can make you feel weak and can lead to dehydration. If you notice signs of dehydration, call your health care provider right away.  Keep all follow-up visits This is important.  This information is not intended to replace advice given to you by your health care provider. Make sure you discuss any questions you have with your health care provider.

## 2024-10-13 NOTE — ED CDU PROVIDER SUBSEQUENT DAY NOTE - PHYSICAL EXAMINATION
CONSTITUTIONAL:  Well appearing, awake, alert, oriented to person, place, time/situation and in no apparent distress.  Pt. is objectively comfortable appearing and verbalizing in full, clear, effortless sentences.  ENMT: NC/AT.  Airway patent.  Nasal mucosa clear.  Moist mucous membranes.  Neck supple.  EYES:  Clear OU.  CARDIAC:  Normal rate, regular rhythm.  Heart sounds S1 S2.  No murmurs, gallops, or rubs.  RESPIRATORY:  Breath sounds clear and equal bilaterally.  No wheezes, no rales, no rhonchi.  GASTROINTESTINAL:  Abdomen soft, non-distended, non-tender.  No rebound, no guarding.  NEUROLOGICAL:  Alert and oriented to person/place/time/situation.  No focal deficits; no tremors noted.   MUSCULOSKELETAL:  Range of motion is not limited.    SKIN:  Skin color unremarkable.  Skin warm, dry, and intact.    PSYCHIATRIC:  Alert and oriented to person/place/time/situation.  Pt calm, pleasant, cooperative.  No apparent risk to self or others.

## 2024-10-13 NOTE — ED CDU PROVIDER SUBSEQUENT DAY NOTE - ATTENDING APP SHARED VISIT CONTRIBUTION OF CARE
The decision was made to admit this patient to the CDU as documented in my Provider note I have reviewed the note  written by the CDU Physician Assistant, on that visit day. I have supervised and participated as necessary in the performance of procedures indicated for patient management and was available at all phases of the patient´s visit when needed.    Please see the  provider note for the details of the decision to admit  Vital Signs Last 24 Hrs  T(F): 98.8 HR: 79 BP: 103/67 RR: 16  SpO2: 99% (13 Oct 2024 01:27)   PE unchanged at time of admission  The patient PTED from [place] where she is treated with marijuana for anxiety while visiting family with severe n,v, admitted for serial exams antiemetics   Plan   Pt with clear apprehension component to vomiting associated with cannabis use  will try using haldol as an antiemetic for both reasons and PO challenge and will dispo accordingly

## 2024-10-13 NOTE — ED CDU PROVIDER DISPOSITION NOTE - PATIENT PORTAL LINK FT
You can access the FollowMyHealth Patient Portal offered by [facility] by registering at the following website: [email or web address]. By joining [employer]’s FollowMyHealth portal, you will also be able to view your health information using other applications (apps) compatible with our system.

## 2024-10-13 NOTE — ED CDU PROVIDER SUBSEQUENT DAY NOTE - HISTORY
33 yo female, PMH anxiety, depression, anemia, migraines, on marijuana as tx for her anxiety/depression by PMD in [place] (pt visiting [place]), presented to this ED c/o N/V/inability to tolerate PO.  Pt was seen previously on 10/10 for cyclic vomiting; was dc'd home, presented back to the ED for persisting symptoms.  Pt denies SI/HI.  In the ED, HD stable, well appearing, in no acute distress, abdomen soft nontender. Labs reviewed, no metabolic disturbance, Phos 2.1 without no other electrolytes imbalance.  Pt was sent to CDU for continued supportive care plan with IV hydration, antiemetics PRN, advance diet as tolerated.  In the interim, pt objectively noted to be resting comfortably; pt has been clinically stable; sporadic attempts overnight of clear liquids resulted in nausea x 2 episodes, nausea/retching x 1 episode.  Antiemetics and later Ativan, gave episodic relief.  No other issues thus far.  AM labs ordered.

## 2024-10-13 NOTE — ED CDU PROVIDER DISPOSITION NOTE - ATTENDING CONTRIBUTION TO CARE
I have made the decision to discharge this patient to the CDU as documented in my Provider note I have reviewed the note  written by the CDU Physician Assistant, on that visit day. I have supervised and participated as necessary in the performance of procedures indicated for patient management and was available at all phases of the patient´s visit when needed.    Patient with + response to haldol was able to tolerate PO   Will d/c with followup   RTED PRN

## 2024-10-13 NOTE — ED CDU PROVIDER DISPOSITION NOTE - CLINICAL COURSE
33 yo female, PMH anxiety, depression, anemia, migraines, on marijuana as tx for her anxiety/depression by PMD in [place] (pt visiting [place]), presented to this ED c/o N/V/inability to tolerate PO.  Pt was seen previously on 10/10 for cyclic vomiting; was dc'd home, presented back to the ED for persisting symptoms.  Pt denies SI/HI.  In the ED, HD stable, well appearing, in no acute distress, abdomen soft nontender. Labs reviewed, no metabolic disturbance, Phos 2.1 without no other electrolytes imbalance.  Pt was sent to CDU for continued supportive care plan with IV hydration, antiemetics PRN, advance diet as tolerated.  patient given antiemetics, Ativan, fluids and then most recently Haldol which broke the vomiting cycle. Able to tolerate PO. Will DC home with rx for antiemetics. Strict ED return precautions discussed. Patient understands and agrees.

## 2024-10-14 ENCOUNTER — OUTPATIENT (OUTPATIENT)
Dept: OUTPATIENT SERVICES | Facility: HOSPITAL | Age: 34
LOS: 1 days | Discharge: ANOTHER HOSPITAL | End: 2024-10-14

## 2024-10-15 DIAGNOSIS — F43.22 ADJUSTMENT DISORDER WITH ANXIETY: ICD-10-CM
